# Patient Record
Sex: FEMALE | Race: BLACK OR AFRICAN AMERICAN | NOT HISPANIC OR LATINO | URBAN - NONMETROPOLITAN AREA
[De-identification: names, ages, dates, MRNs, and addresses within clinical notes are randomized per-mention and may not be internally consistent; named-entity substitution may affect disease eponyms.]

---

## 2018-10-08 PROBLEM — H10.423: Noted: 2018-10-08

## 2020-11-13 ENCOUNTER — IMPORTED ENCOUNTER (OUTPATIENT)
Dept: URBAN - NONMETROPOLITAN AREA CLINIC 1 | Facility: CLINIC | Age: 30
End: 2020-11-13

## 2020-11-13 PROCEDURE — S0621 ROUTINE OPHTHALMOLOGICAL EXA: HCPCS

## 2020-11-13 NOTE — PATIENT DISCUSSION
Astigmatism OUDiscussed refractive status in detail with patient. New glasses Rx given today. Continue to monitor yearly or PRN. Ocular Alleriges OUDiscussed findings in detail with patient. 1+papillae noted OU today. Recommend Pataday QD OU/PRN coupon given today. Continue to monitor.  Optos done today; normal ocular health

## 2022-04-09 ASSESSMENT — VISUAL ACUITY
OD_CC: 20/70
OS_CC: 20/50-
OS_PH: 20/30
OD_PH: 20/30

## 2022-04-09 ASSESSMENT — TONOMETRY
OD_IOP_MMHG: 14
OS_IOP_MMHG: 14

## 2022-11-11 NOTE — PATIENT DISCUSSION
(Allergic) Continue OTC Pataday QD OU, PRN itching. Condition discussed with patient. Avoidance of allergens recommended.